# Patient Record
Sex: MALE | Race: OTHER | URBAN - METROPOLITAN AREA
[De-identification: names, ages, dates, MRNs, and addresses within clinical notes are randomized per-mention and may not be internally consistent; named-entity substitution may affect disease eponyms.]

---

## 2023-10-03 ENCOUNTER — INPATIENT (INPATIENT)
Facility: HOSPITAL | Age: 38
LOS: 0 days | Discharge: HOME | DRG: 313 | End: 2023-10-04
Attending: INTERNAL MEDICINE | Admitting: INTERNAL MEDICINE
Payer: MEDICAID

## 2023-10-03 VITALS
WEIGHT: 188.94 LBS | SYSTOLIC BLOOD PRESSURE: 112 MMHG | DIASTOLIC BLOOD PRESSURE: 70 MMHG | HEART RATE: 54 BPM | TEMPERATURE: 98 F | RESPIRATION RATE: 16 BRPM | OXYGEN SATURATION: 99 %

## 2023-10-03 DIAGNOSIS — R07.9 CHEST PAIN, UNSPECIFIED: ICD-10-CM

## 2023-10-03 DIAGNOSIS — I25.10 ATHEROSCLEROTIC HEART DISEASE OF NATIVE CORONARY ARTERY WITHOUT ANGINA PECTORIS: ICD-10-CM

## 2023-10-03 LAB
ANION GAP SERPL CALC-SCNC: 8 MMOL/L — SIGNIFICANT CHANGE UP (ref 5–17)
BUN SERPL-MCNC: 20 MG/DL — SIGNIFICANT CHANGE UP (ref 7–23)
CALCIUM SERPL-MCNC: 9 MG/DL — SIGNIFICANT CHANGE UP (ref 8.4–10.5)
CHLORIDE SERPL-SCNC: 102 MMOL/L — SIGNIFICANT CHANGE UP (ref 96–108)
CK MB CFR SERPL CALC: 2.4 NG/ML — SIGNIFICANT CHANGE UP (ref 0–6.7)
CK SERPL-CCNC: 209 U/L — HIGH (ref 30–200)
CO2 SERPL-SCNC: 27 MMOL/L — SIGNIFICANT CHANGE UP (ref 22–31)
CREAT SERPL-MCNC: 0.84 MG/DL — SIGNIFICANT CHANGE UP (ref 0.5–1.3)
CRP SERPL-MCNC: <3 MG/L — SIGNIFICANT CHANGE UP (ref 0–4)
D DIMER BLD IA.RAPID-MCNC: <150 NG/ML DDU — SIGNIFICANT CHANGE UP
EGFR: 116 ML/MIN/1.73M2 — SIGNIFICANT CHANGE UP
ERYTHROCYTE [SEDIMENTATION RATE] IN BLOOD: 5 MM/HR — SIGNIFICANT CHANGE UP
GLUCOSE SERPL-MCNC: 86 MG/DL — SIGNIFICANT CHANGE UP (ref 70–99)
HCT VFR BLD CALC: 41.3 % — SIGNIFICANT CHANGE UP (ref 39–50)
HGB BLD-MCNC: 13.5 G/DL — SIGNIFICANT CHANGE UP (ref 13–17)
MCHC RBC-ENTMCNC: 28.6 PG — SIGNIFICANT CHANGE UP (ref 27–34)
MCHC RBC-ENTMCNC: 32.7 GM/DL — SIGNIFICANT CHANGE UP (ref 32–36)
MCV RBC AUTO: 87.5 FL — SIGNIFICANT CHANGE UP (ref 80–100)
NRBC # BLD: 0 /100 WBCS — SIGNIFICANT CHANGE UP (ref 0–0)
NT-PROBNP SERPL-SCNC: <36 PG/ML — SIGNIFICANT CHANGE UP (ref 0–300)
PLATELET # BLD AUTO: 277 K/UL — SIGNIFICANT CHANGE UP (ref 150–400)
POTASSIUM SERPL-MCNC: 4.7 MMOL/L — SIGNIFICANT CHANGE UP (ref 3.5–5.3)
POTASSIUM SERPL-SCNC: 4.7 MMOL/L — SIGNIFICANT CHANGE UP (ref 3.5–5.3)
RBC # BLD: 4.72 M/UL — SIGNIFICANT CHANGE UP (ref 4.2–5.8)
RBC # FLD: 12.2 % — SIGNIFICANT CHANGE UP (ref 10.3–14.5)
SODIUM SERPL-SCNC: 137 MMOL/L — SIGNIFICANT CHANGE UP (ref 135–145)
TROPONIN T, HIGH SENSITIVITY RESULT: <6 NG/L — SIGNIFICANT CHANGE UP (ref 0–51)
TROPONIN T, HIGH SENSITIVITY RESULT: <6 NG/L — SIGNIFICANT CHANGE UP (ref 0–51)
WBC # BLD: 6.9 K/UL — SIGNIFICANT CHANGE UP (ref 3.8–10.5)
WBC # FLD AUTO: 6.9 K/UL — SIGNIFICANT CHANGE UP (ref 3.8–10.5)

## 2023-10-03 PROCEDURE — 93321 DOPPLER ECHO F-UP/LMTD STD: CPT | Mod: 26

## 2023-10-03 PROCEDURE — 93308 TTE F-UP OR LMTD: CPT | Mod: 26

## 2023-10-03 PROCEDURE — 74174 CTA ABD&PLVS W/CONTRAST: CPT | Mod: 26,MA

## 2023-10-03 PROCEDURE — 99223 1ST HOSP IP/OBS HIGH 75: CPT

## 2023-10-03 PROCEDURE — 71045 X-RAY EXAM CHEST 1 VIEW: CPT | Mod: 26

## 2023-10-03 PROCEDURE — 71275 CT ANGIOGRAPHY CHEST: CPT | Mod: 26,MA

## 2023-10-03 PROCEDURE — 93010 ELECTROCARDIOGRAM REPORT: CPT

## 2023-10-03 PROCEDURE — 99291 CRITICAL CARE FIRST HOUR: CPT

## 2023-10-03 RX ORDER — ASPIRIN/CALCIUM CARB/MAGNESIUM 324 MG
325 TABLET ORAL ONCE
Refills: 0 | Status: COMPLETED | OUTPATIENT
Start: 2023-10-03 | End: 2023-10-03

## 2023-10-03 RX ORDER — INFLUENZA VIRUS VACCINE 15; 15; 15; 15 UG/.5ML; UG/.5ML; UG/.5ML; UG/.5ML
0.5 SUSPENSION INTRAMUSCULAR ONCE
Refills: 0 | Status: DISCONTINUED | OUTPATIENT
Start: 2023-10-03 | End: 2023-10-04

## 2023-10-03 RX ORDER — SODIUM CHLORIDE 9 MG/ML
1000 INJECTION INTRAMUSCULAR; INTRAVENOUS; SUBCUTANEOUS ONCE
Refills: 0 | Status: COMPLETED | OUTPATIENT
Start: 2023-10-03 | End: 2023-10-03

## 2023-10-03 RX ADMIN — Medication 325 MILLIGRAM(S): at 18:25

## 2023-10-03 RX ADMIN — SODIUM CHLORIDE 1000 MILLILITER(S): 9 INJECTION INTRAMUSCULAR; INTRAVENOUS; SUBCUTANEOUS at 20:12

## 2023-10-03 NOTE — H&P ADULT - PROBLEM SELECTOR PLAN 1
- Intermittent sharp, mid-sternal, non-radiating, chest pain w/ episodes lasting ~30 seconds; was sent via EMS from [facility] due to EKG findings.   - EKG: SB w/ LAKISHA V2-V4 w/ biphasic Twaves; HS Trop T < 6.   - Limited TTE in ED normal LV function, no RWMA, mild LVH, and trace TR.   - CTA Chest/Abd/Pelvis (-) for aortic dissection or aneurysm.   - OF NOTE: pt does report having "really bad cold" ~2wks ago suggestive of possible viral URI.   - PLAN: admit for repeat cardiac enzymes and EKGs. Official TTE in AM. ESR/CRP/D-dimer ordered. Presentation consistent with post-viral infection myocarditis, less likely ACS presentation.     DVT ppx: pt ambulatory  Dispo: pending further work up - Intermittent sharp, mid-sternal, non-radiating, chest pain w/ episodes lasting ~30 seconds; was sent via EMS from [facility] due to EKG findings.   - EKG: SB w/ LAKISHA V2-V4 w/ biphasic Twaves; HS Trop T < 6.   - Limited TTE in ED normal LV function, no RWMA, mild LVH, and trace TR.   - CTA Chest/Abd/Pelvis (-) for aortic dissection or aneurysm.   - OF NOTE: pt does report having "really bad cold" ~2wks ago suggestive of possible viral URI.   - PLAN: admit for repeat cardiac enzymes and EKGs. Official TTE in AM. ESR/CRP/D-dimer ordered. Presentation consistent with post-viral infection pericarditis less likely ACS presentation.     DVT ppx: pt ambulatory  Dispo: pending further work up

## 2023-10-03 NOTE — ED ADULT NURSE NOTE - NSFALLUNIVINTERV_ED_ALL_ED
Bed/Stretcher in lowest position, wheels locked, appropriate side rails in place/Call bell, personal items and telephone in reach/Instruct patient to call for assistance before getting out of bed/chair/stretcher/Non-slip footwear applied when patient is off stretcher/[place] to call system/Physically safe environment - no spills, clutter or unnecessary equipment/Purposeful proactive rounding/Room/bathroom lighting operational, light cord in reach

## 2023-10-03 NOTE — PATIENT PROFILE ADULT - FALL HARM RISK - UNIVERSAL INTERVENTIONS
Bed in lowest position, wheels locked, appropriate side rails in place/Call bell, personal items and telephone in reach/Instruct patient to call for assistance before getting out of bed or chair/Non-slip footwear when patient is out of bed/[place] to call system/Physically safe environment - no spills, clutter or unnecessary equipment/Purposeful Proactive Rounding/Room/bathroom lighting operational, light cord in reach
4 = No assist / stand by assistance

## 2023-10-03 NOTE — H&P ADULT - ASSESSMENT
Pt is 37yo Male w/ no reported PMHx of who presented to [facility] ED endorsing sharp, non-exertional, non-reproducible, non-pleuritic midsternal chest pain that radiates to his back. Pain comes on intermittently, and he reports he was evaluated at [facility] 2 days prior to presentation and discharge home (reports he did not have EKG at that time). EKG at [facility] revealing sinus bradycardial w/ LAKISHA in V2-V4 with biphasic Twaves. Limited TTE in ED showed mild LVH, normal LV function, no RWMA, and trace MR. Interventional Cardiology consulted in ED and in light of HS Trop T <6, hemodynamically stable, and no active symptoms upon evaluation, and TTE w/ normal LV systolic fxn and no RWMA, no STEMI code called as not likely ACS presentation. CTA in ED negative for aortic dissection / aneurysmal dilation. Patient admitted to cardiac telemetry for further work up of chest discomfort.      37yo M w/ no PMHx presented to [facility] from a clinic due to abnormal EKG. Pt reporting intermittent episodes of sharp midsternal CP, non-exertional, non-reproducible. CTA (-) for aortic dissection. EKG w/ LAKISHA V2-V4 - interventional cardiology consulted in ED and no STEMI code called given asymptomatic, HS TropT < 6, TTE w/ normal EF and no RWMA. Pt reports having "really bad cold" ~2wks ago. Presentation suspicious for myocarditis in the setting of suspected recent viral URI; low suspicion for ACS. Repeat cardiac enzymes/EKG now and in AM. ESR/CRP/D-dimer ordered. Official TTE ordered for the morning.    37yo M w/ no PMHx presented to [facility] from a clinic due to abnormal EKG. Pt reporting intermittent episodes of sharp midsternal CP, non-exertional, non-reproducible. CTA (-) for aortic dissection. EKG w/ LAKISHA V2-V4 - interventional cardiology consulted in ED and no STEMI code called given asymptomatic, HS TropT < 6, TTE w/ normal EF and no RWMA. Pt reports having "really bad cold" ~2wks ago. Presentation suspicious for pericarditis in the setting of suspected recent viral URI; low suspicion for ACS. Repeat cardiac enzymes/EKG now and in AM. ESR/CRP/D-dimer ordered. Official TTE ordered for the morning.

## 2023-10-03 NOTE — H&P ADULT - HISTORY OF PRESENT ILLNESS
Pt is 37yo Male w/ no reported PMHx of who presented to [facility] ED endorsing sharp, non-exertional, non-reproducible, non-pleuritic midsternal chest pain that radiates to his back. Pain comes on intermittently, and he reports he was evaluated at [facility] 2 days prior to presentation and discharge home (reports he did not have EKG at that time). EKG at [facility] revealing sinus bradycardial w/ LAKISHA in V2-V4 with biphasic Twaves. Limited TTE in ED showed mild LVH, normal LV function, no RWMA, and trace MR. Interventional Cardiology consulted in ED and in light of HS Trop T <6, hemodynamically stable, and no active symptoms upon evaluation, and TTE w/ normal LV systolic fxn and no RWMA, no STEMI code called as not likely ACS presentation. CTA in ED negative for aortic dissection / aneurysmal dilation. Patient admitted to cardiac telemetry for further work up of chest discomfort.      Pt is 39yo Male w/ no reported PMHx of who presented to [facility] ED endorsing sharp, non-exertional, non-reproducible, non-pleuritic midsternal chest pain that radiates to his back. Pain comes on intermittently and episodes last ~30seconds. He went to a [facility] clinic and was sent to [facility] via ambulance due to EKG findings. EKG at [facility] revealing sinus bradycardia w/ LAKISHA in V2-V4 with biphasic Twaves. At this time patient asymptomatic. Limited TTE in ED showed mild LVH, normal LV function, no RWMA, and trace MR. Interventional Cardiology consulted in ED and in light of HS Trop T <6, hemodynamically stable, and no active symptoms upon evaluation, and TTE w/ normal LV systolic fxn and no RWMA, no STEMI code called as not likely ACS presentation. CTA in ED negative for aortic dissection / aneurysmal dilation. Patient admitted to cardiac telemetry for further work up of chest discomfort. OF NOTE, patient does report having a "really bad cold" roughly 2wks ago (home rapid covid test was negative).

## 2023-10-03 NOTE — H&P ADULT - NS ATTEND OPT1 GEN_ALL_CORE
Liquor- vodka 12 shots/10 or more I attest my time as attending is greater than 50% of the total combined time spent on qualifying patient care activities by the PA/NP and attending.

## 2023-10-03 NOTE — ED PROVIDER NOTE - NSICDXPASTSURGICALHX_GEN_ALL_CORE_FT
Progress Notes by Taina Chapman, RN at 03/13/17 02:46 PM     Author:  Taina Chapman, RN Service:  (none) Author Type:  Registered Nurse     Filed:  03/15/17 12:05 PM Encounter Date:  3/13/2017 Status:  Signed     :  Taina Chapman RN (Registered Nurse)            Pre-Authorization request started for[DM1.1T] CT A/P[DM1.1M]  In process, medical review.  · Case #[DM1.1T]  [id number][DM1.1C]  (case or reference # is not an authorization #)  Please allow 2-4 business days.[DM1.1T]        Revision History        User Key Date/Time User Provider Type Action    > DM1.1 03/15/17 12:05 PM Taina Chapman, RN Registered Nurse Sign    C - Copied, M - Manual, T - Template             PAST SURGICAL HISTORY:  No significant past surgical history

## 2023-10-03 NOTE — ED PROVIDER NOTE - PHYSICAL EXAMINATION
VITAL SIGNS: I have reviewed nursing notes and confirm.  CONSTITUTIONAL:  in no acute distress.   SKIN:  warm and dry, no acute rash.   HEAD:  normocephalic, atraumatic.  EYES: PERRL, EOM intact; conjunctiva and sclera clear.  ENT: No nasal discharge; airway clear.   NECK: Supple; non tender.  CARD: S1, S2 normal; no murmurs, gallops, or rubs. Regular rate and rhythm.   RESP:  Clear to auscultation b/l, no wheezes, rales or rhonchi.  ABD: Normal bowel sounds; soft; non-distended; non-tender; no guarding/ rebound.  MSK: Normal ROM. No clubbing, cyanosis or edema. no ttp bilat le, no cw ttp  NEURO: Alert, oriented, grossly unremarkable  PSYCH: Cooperative, mood and affect appropriate.

## 2023-10-03 NOTE — ED PROVIDER NOTE - ST/T WAVE
st elevation v2, 3, nonspecific change v4, no st depressions st elevation v2, v3, nonspecific change v4, twi 3, no st depression

## 2023-10-03 NOTE — H&P ADULT - NS ATTEND AMEND GEN_ALL_CORE FT
37yo M w/ no PMHx presented to [facility] from a clinic due to abnormal EKG.    1. Abnormal EKG  Young male from [place] without family hx of CAD or SCD, without known cardiac risk factors presents with chest/back pain and EKG concerning for Wellen's t waves. HS troponin negative x2. Echo without evidence of wall motion abnormality, CTA to rule out dissection -- negative. Given abnormal EKG, coronary CTA very reasonable, if negative, ok to discharge home.

## 2023-10-03 NOTE — ED ADULT NURSE REASSESSMENT NOTE - NS ED NURSE REASSESS COMMENT FT1
Report taken from day shift RN. Pt awake and alert, AOx4. Pt breathing spontaneously and unlabored. Pt speaking in clear, full sentences, no s/sx of acute distress. Pt denies CP at this time. Pt has no co at this time. VS documented in flow sheet.

## 2023-10-03 NOTE — ED PROVIDER NOTE - OBJECTIVE STATEMENT
39 yo M h/o poss hld (not on meds), no h/o htn, dm, tob, cad, fh cad s/o intermittent L sided cp lasting seconds, sharp, no radiation x 2 d.  Pt also noted some upper back pain but denied cp radiated to back. Pt eval at [facility] yest but reports no ekg done. Pt went to a dr today for his cp and was sent to the ER for an abnl ekg.  Pt denies current cp.  No associated sob, palpitations, le pain/swelling, recent travel, h/o similar cp.  Pt denies exertional cp/sob - plays soccer and runs 7 mi regularly.  No recent uri sx.  No change w sitting vs laying.

## 2023-10-03 NOTE — CHART NOTE - NSCHARTNOTEFT_GEN_A_CORE
37 yo M with reportedly no past medical history presented sent in by PCP given an abnormal ECG in office.    Reports that he experienced upper back pain two days prior prompting an ED visit that was relieved by ibuprofen. He also had sharp chest pain (mid sternum), non radiating, non-reproducible, non pleuritic, and not associated with exertion. He did not note and exacerbating factors, the chest pain had resolved by the time he followed up with an outpatient primary care provider.     In the ED at [facility] today:  ECG showed sinus bradycardia, LAKISHA in V2-V4 with biphasic Twaves   The patient denied chest pain at that time, still endorsed mild upper back pain.     Limited TTE:  Technically difficult study   Mild LVH  Normal LV function  No regional wall motion abnormalities  Trace MR   No AI     /70 in both arms   Labs: Normal CBC, CMP and High sens Troponin <6    Low suspicion for ACS  Recommend CTA to r/o aortic dissection given history     Discussed with on call cath attending Dr. Pascual Alvarado MD  Cardiovascular Disease Fellow, PGY-6

## 2023-10-03 NOTE — PATIENT PROFILE ADULT - FALL HARM RISK - FACTORS NURSING JUDGEMENT
Pt A&O on rounds no resp distress spouse  pt in next bed belligerent. States pt cant be disch without food. Food given to pt and distch
No
English

## 2023-10-03 NOTE — ED PROVIDER NOTE - CLINICAL SUMMARY MEDICAL DECISION MAKING FREE TEXT BOX
Pt c/o atypical cp x 2 d w/o current cp w v abnl ekg w st elevations.  Interventional fellow contacted for abnl ekg concerning for stemi w atypical story, reviewed ekg and felt pt did not meet cath lab criteria but sent his colleague to ed to do bedside echo.  Bedside echo w/o abnl wall motion.  Card request cta for dissection and, if neg, admit to card.  CTA neg.  Pt admitted to card; pt remained cp free in ed.  Rpt ekg w ongoing st abnl and shared w card team.

## 2023-10-03 NOTE — H&P ADULT - NSHPLABSRESULTS_GEN_ALL_CORE
13.5   6.90  )-----------( 277      ( 03 Oct 2023 18:04 )             41.3     137  |  102  |  20  ----------------------------<  86  4.7   |  27  |  0.84    Ca    9.0      03 Oct 2023 18:04    Urinalysis Basic - ( 03 Oct 2023 18:04 )    Color: x / Appearance: x / SG: x / pH: x  Gluc: 86 mg/dL / Ketone: x  / Bili: x / Urobili: x   Blood: x / Protein: x / Nitrite: x   Leuk Esterase: x / RBC: x / WBC x   Sq Epi: x / Non Sq Epi: x / Bacteria: x

## 2023-10-03 NOTE — ED ADULT TRIAGE NOTE - CHIEF COMPLAINT QUOTE
Pt BIBEMS from clinic for r/o pericarditis. Pt c/o intermittent cp x days. Pt denies sob, numbness/tingling, n/v. No pmh.

## 2023-10-03 NOTE — ED ADULT NURSE NOTE - OBJECTIVE STATEMENT
Patient presents to the ED complaining on and off chest pain for the past few days. Patient states that he was at a clinic and was sent here because the EKG was abnormal. Patient denies any chest pain at this time. Denies any medical history.

## 2023-10-04 VITALS
HEART RATE: 49 BPM | TEMPERATURE: 98 F | RESPIRATION RATE: 18 BRPM | OXYGEN SATURATION: 100 % | DIASTOLIC BLOOD PRESSURE: 63 MMHG | SYSTOLIC BLOOD PRESSURE: 119 MMHG

## 2023-10-04 LAB
A1C WITH ESTIMATED AVERAGE GLUCOSE RESULT: 5.6 % — SIGNIFICANT CHANGE UP (ref 4–5.6)
ALBUMIN SERPL ELPH-MCNC: 4 G/DL — SIGNIFICANT CHANGE UP (ref 3.3–5)
ALP SERPL-CCNC: 98 U/L — SIGNIFICANT CHANGE UP (ref 40–120)
ALT FLD-CCNC: 104 U/L — HIGH (ref 10–45)
AMPHET UR-MCNC: NEGATIVE — SIGNIFICANT CHANGE UP
AMYLASE P1 CFR SERPL: 79 U/L — SIGNIFICANT CHANGE UP (ref 25–125)
ANION GAP SERPL CALC-SCNC: 11 MMOL/L — SIGNIFICANT CHANGE UP (ref 5–17)
ANION GAP SERPL CALC-SCNC: 7 MMOL/L — SIGNIFICANT CHANGE UP (ref 5–17)
AST SERPL-CCNC: 38 U/L — SIGNIFICANT CHANGE UP (ref 10–40)
BARBITURATES UR SCN-MCNC: NEGATIVE — SIGNIFICANT CHANGE UP
BENZODIAZ UR-MCNC: NEGATIVE — SIGNIFICANT CHANGE UP
BILIRUB SERPL-MCNC: 0.3 MG/DL — SIGNIFICANT CHANGE UP (ref 0.2–1.2)
BUN SERPL-MCNC: 15 MG/DL — SIGNIFICANT CHANGE UP (ref 7–23)
BUN SERPL-MCNC: 17 MG/DL — SIGNIFICANT CHANGE UP (ref 7–23)
CALCIUM SERPL-MCNC: 8.8 MG/DL — SIGNIFICANT CHANGE UP (ref 8.4–10.5)
CALCIUM SERPL-MCNC: 9.2 MG/DL — SIGNIFICANT CHANGE UP (ref 8.4–10.5)
CALCIUM SERPL-MCNC: 9.4 MG/DL — SIGNIFICANT CHANGE UP (ref 8.4–10.5)
CHLORIDE SERPL-SCNC: 102 MMOL/L — SIGNIFICANT CHANGE UP (ref 96–108)
CHLORIDE SERPL-SCNC: 103 MMOL/L — SIGNIFICANT CHANGE UP (ref 96–108)
CHOLEST SERPL-MCNC: 196 MG/DL — SIGNIFICANT CHANGE UP
CK MB CFR SERPL CALC: 2.1 NG/ML — SIGNIFICANT CHANGE UP (ref 0–6.7)
CK SERPL-CCNC: 185 U/L — SIGNIFICANT CHANGE UP (ref 30–200)
CO2 SERPL-SCNC: 23 MMOL/L — SIGNIFICANT CHANGE UP (ref 22–31)
CO2 SERPL-SCNC: 28 MMOL/L — SIGNIFICANT CHANGE UP (ref 22–31)
COCAINE METAB.OTHER UR-MCNC: NEGATIVE — SIGNIFICANT CHANGE UP
CREAT SERPL-MCNC: 0.74 MG/DL — SIGNIFICANT CHANGE UP (ref 0.5–1.3)
CREAT SERPL-MCNC: 0.78 MG/DL — SIGNIFICANT CHANGE UP (ref 0.5–1.3)
EGFR: 119 ML/MIN/1.73M2 — SIGNIFICANT CHANGE UP
EGFR: 120 ML/MIN/1.73M2 — SIGNIFICANT CHANGE UP
ESTIMATED AVERAGE GLUCOSE: 114 MG/DL — SIGNIFICANT CHANGE UP (ref 68–114)
GLUCOSE SERPL-MCNC: 117 MG/DL — HIGH (ref 70–99)
GLUCOSE SERPL-MCNC: 88 MG/DL — SIGNIFICANT CHANGE UP (ref 70–99)
HCT VFR BLD CALC: 41.9 % — SIGNIFICANT CHANGE UP (ref 39–50)
HDLC SERPL-MCNC: 38 MG/DL — LOW
HGB BLD-MCNC: 13.5 G/DL — SIGNIFICANT CHANGE UP (ref 13–17)
LIDOCAIN IGE QN: 31 U/L — SIGNIFICANT CHANGE UP (ref 7–60)
LIPID PNL WITH DIRECT LDL SERPL: 142 MG/DL — HIGH
MAGNESIUM SERPL-MCNC: 2 MG/DL — SIGNIFICANT CHANGE UP (ref 1.6–2.6)
MCHC RBC-ENTMCNC: 28.6 PG — SIGNIFICANT CHANGE UP (ref 27–34)
MCHC RBC-ENTMCNC: 32.2 GM/DL — SIGNIFICANT CHANGE UP (ref 32–36)
MCV RBC AUTO: 88.8 FL — SIGNIFICANT CHANGE UP (ref 80–100)
METHADONE UR-MCNC: NEGATIVE — SIGNIFICANT CHANGE UP
NON HDL CHOLESTEROL: 158 MG/DL — HIGH
NRBC # BLD: 0 /100 WBCS — SIGNIFICANT CHANGE UP (ref 0–0)
OPIATES UR-MCNC: NEGATIVE — SIGNIFICANT CHANGE UP
PCP SPEC-MCNC: SIGNIFICANT CHANGE UP
PCP UR-MCNC: NEGATIVE — SIGNIFICANT CHANGE UP
PHOSPHATE SERPL-MCNC: 3.1 MG/DL — SIGNIFICANT CHANGE UP (ref 2.5–4.5)
PLATELET # BLD AUTO: 258 K/UL — SIGNIFICANT CHANGE UP (ref 150–400)
POTASSIUM SERPL-MCNC: 4 MMOL/L — SIGNIFICANT CHANGE UP (ref 3.5–5.3)
POTASSIUM SERPL-MCNC: 4.3 MMOL/L — SIGNIFICANT CHANGE UP (ref 3.5–5.3)
POTASSIUM SERPL-SCNC: 4 MMOL/L — SIGNIFICANT CHANGE UP (ref 3.5–5.3)
POTASSIUM SERPL-SCNC: 4.3 MMOL/L — SIGNIFICANT CHANGE UP (ref 3.5–5.3)
PROT SERPL-MCNC: 7.4 G/DL — SIGNIFICANT CHANGE UP (ref 6–8.3)
RBC # BLD: 4.72 M/UL — SIGNIFICANT CHANGE UP (ref 4.2–5.8)
RBC # FLD: 12.4 % — SIGNIFICANT CHANGE UP (ref 10.3–14.5)
SODIUM SERPL-SCNC: 137 MMOL/L — SIGNIFICANT CHANGE UP (ref 135–145)
SODIUM SERPL-SCNC: 137 MMOL/L — SIGNIFICANT CHANGE UP (ref 135–145)
T4 AB SER-ACNC: 6.64 UG/DL — SIGNIFICANT CHANGE UP (ref 4.5–11.7)
THC UR QL: POSITIVE
TRIGL SERPL-MCNC: 80 MG/DL — SIGNIFICANT CHANGE UP
TROPONIN T, HIGH SENSITIVITY RESULT: 6 NG/L — SIGNIFICANT CHANGE UP (ref 0–51)
TSH SERPL-MCNC: 3.35 UIU/ML — SIGNIFICANT CHANGE UP (ref 0.27–4.2)
WBC # BLD: 4.92 K/UL — SIGNIFICANT CHANGE UP (ref 3.8–10.5)
WBC # FLD AUTO: 4.92 K/UL — SIGNIFICANT CHANGE UP (ref 3.8–10.5)

## 2023-10-04 PROCEDURE — 80307 DRUG TEST PRSMV CHEM ANLYZR: CPT

## 2023-10-04 PROCEDURE — 99291 CRITICAL CARE FIRST HOUR: CPT | Mod: 25

## 2023-10-04 PROCEDURE — 84436 ASSAY OF TOTAL THYROXINE: CPT

## 2023-10-04 PROCEDURE — 86140 C-REACTIVE PROTEIN: CPT

## 2023-10-04 PROCEDURE — 84484 ASSAY OF TROPONIN QUANT: CPT

## 2023-10-04 PROCEDURE — 83735 ASSAY OF MAGNESIUM: CPT

## 2023-10-04 PROCEDURE — 75574 CT ANGIO HRT W/3D IMAGE: CPT

## 2023-10-04 PROCEDURE — 82150 ASSAY OF AMYLASE: CPT

## 2023-10-04 PROCEDURE — 71045 X-RAY EXAM CHEST 1 VIEW: CPT

## 2023-10-04 PROCEDURE — 93306 TTE W/DOPPLER COMPLETE: CPT

## 2023-10-04 PROCEDURE — 83880 ASSAY OF NATRIURETIC PEPTIDE: CPT

## 2023-10-04 PROCEDURE — 84443 ASSAY THYROID STIM HORMONE: CPT

## 2023-10-04 PROCEDURE — 82310 ASSAY OF CALCIUM: CPT

## 2023-10-04 PROCEDURE — 85379 FIBRIN DEGRADATION QUANT: CPT

## 2023-10-04 PROCEDURE — 71275 CT ANGIOGRAPHY CHEST: CPT | Mod: MA

## 2023-10-04 PROCEDURE — 80061 LIPID PANEL: CPT

## 2023-10-04 PROCEDURE — 82550 ASSAY OF CK (CPK): CPT

## 2023-10-04 PROCEDURE — 75574 CT ANGIO HRT W/3D IMAGE: CPT | Mod: 26

## 2023-10-04 PROCEDURE — 80048 BASIC METABOLIC PNL TOTAL CA: CPT

## 2023-10-04 PROCEDURE — 85027 COMPLETE CBC AUTOMATED: CPT

## 2023-10-04 PROCEDURE — 93306 TTE W/DOPPLER COMPLETE: CPT | Mod: 26

## 2023-10-04 PROCEDURE — 83690 ASSAY OF LIPASE: CPT

## 2023-10-04 PROCEDURE — 36415 COLL VENOUS BLD VENIPUNCTURE: CPT

## 2023-10-04 PROCEDURE — 93005 ELECTROCARDIOGRAM TRACING: CPT

## 2023-10-04 PROCEDURE — 93010 ELECTROCARDIOGRAM REPORT: CPT

## 2023-10-04 PROCEDURE — 85652 RBC SED RATE AUTOMATED: CPT

## 2023-10-04 PROCEDURE — 80053 COMPREHEN METABOLIC PANEL: CPT

## 2023-10-04 PROCEDURE — 83036 HEMOGLOBIN GLYCOSYLATED A1C: CPT

## 2023-10-04 PROCEDURE — 82553 CREATINE MB FRACTION: CPT

## 2023-10-04 PROCEDURE — 84100 ASSAY OF PHOSPHORUS: CPT

## 2023-10-04 PROCEDURE — 74174 CTA ABD&PLVS W/CONTRAST: CPT | Mod: MA

## 2023-10-04 RX ORDER — ONDANSETRON 8 MG/1
4 TABLET, FILM COATED ORAL ONCE
Refills: 0 | Status: COMPLETED | OUTPATIENT
Start: 2023-10-04 | End: 2023-10-04

## 2023-10-04 RX ORDER — SODIUM CHLORIDE 9 MG/ML
1000 INJECTION INTRAMUSCULAR; INTRAVENOUS; SUBCUTANEOUS
Refills: 0 | Status: DISCONTINUED | OUTPATIENT
Start: 2023-10-04 | End: 2023-10-04

## 2023-10-04 RX ADMIN — SODIUM CHLORIDE 100 MILLILITER(S): 9 INJECTION INTRAMUSCULAR; INTRAVENOUS; SUBCUTANEOUS at 09:22

## 2023-10-04 RX ADMIN — ONDANSETRON 4 MILLIGRAM(S): 8 TABLET, FILM COATED ORAL at 13:59

## 2023-10-04 NOTE — DISCHARGE NOTE PROVIDER - HOSPITAL COURSE
39 y/o Male w/ no reported PMHx of who presented to [facility] ED endorsing sharp, non-exertional, non-reproducible, non-pleuritic midsternal chest pain that radiates to his back. Pain comes on intermittently and episodes last ~30seconds. He went to a [facility] clinic and was sent to [facility] via ambulance due to EKG findings. EKG at [facility] revealing sinus bradycardia w/ LAKISHA in V2-V4 with biphasic Twaves. At this time patient asymptomatic. Limited TTE in ED showed mild LVH, normal LV function, no RWMA, and trace MR. Interventional Cardiology consulted in ED and in light of HS Trop T <6, hemodynamically stable, and no active symptoms upon evaluation, and TTE w/ normal LV systolic fxn and no RWMA, no STEMI code called as not likely ACS presentation. CTA in ED negative for aortic dissection / aneurysmal dilation. Patient admitted to cardiac telemetry for further work up of chest discomfort. OF NOTE, patient does report having a "really bad cold" roughly 2wks ago (home rapid covid test was negative). 37 y/o Male w/ no pertinent FamHx or PMHx, recent URI 2wks ago, of who presented to [facility] ED endorsing sharp, non-exertional, non-reproducible, non-pleuritic midsternal chest pain that radiates to his back. Reports been having intermittent upper L back pain x 2 weeks, and developed chest pain episode that lasted ~30seconds. He went to [facility] clinic and was sent to [facility] via ambulance due to EKG findings. EKG at [facility] revealing sinus bradycardia w/ LAKISHA in V2-V4 with biphasic Twaves. Repeat EKG NSR 60s, ST elevation V1-V4 Remained asymptomatic. Limited TTE showed mild LVH, normal LV function, no RWMA, and trace MR. HS Trop T neg x3. Per interventional cardiology, no STEMI code called as not likely ACS presentation. CTA chest/abd/pelvis negative for aortic dissection / aneurysmal dilation. Admitted to cardiac telemetry for further work up of chest discomfort. 39 y/o Male w/ no pertinent FamHx or PMHx, recent URI 2wks ago, of who presented to [facility] ED endorsing sharp, non-exertional, non-reproducible, non-pleuritic midsternal chest pain that radiates to his back, chest pain episode that lasted ~30seconds. Reports been having intermittent upper L back pain x 2 weeks worse w/ turning head to the right. He went to [facility] clinic and was sent to [facility] via ambulance due to EKG findings. EKG at [facility] revealing sinus bradycardia w/ LAKISHA in V2-V4 with biphasic Twaves. Repeat EKG NSR 60s, progressive ST elevation V1-V4. Remained asymptomatic. Limited TTE showed mild LVH, normal LV function, no RWMA, and trace MR. HS Trop T neg x3. Per interventional cardiology, no STEMI code called as not likely ACS presentation. CTA chest/abd/pelvis negative for aortic dissection / aneurysmal dilation. Admitted to cardiac telemetry and ruled out for ACS. CCTA w/ zero calcium score and Normal coronary arteries.     On the day of discharge, the patient was seen and examined. Symptoms resolved. Vital signs are stable. Labs and imaging reviewed. Patient is medically optimized and hemodynamically stable. Return precautions discussed, medication teach back done, and importance of physician followup emphasized. The patient verbalized understanding. 39 y/o Male w/ no pertinent FamHx or PMHx, recent URI 2wks ago, of who presented to [facility] ED endorsing sharp, non-exertional, non-reproducible, non-pleuritic midsternal chest pain that radiates to his back, chest pain episode that lasted ~30seconds. Reports been having intermittent upper L back pain x 2 weeks worse w/ turning head to the right. He went to [facility] clinic and was sent to [facility] via ambulance due to EKG findings. EKG at [facility] revealing sinus bradycardia w/ LAKISHA in V2-V4 with biphasic Twaves. Repeat EKG NSR 60s, progressive ST elevation V1-V4. Pt remained asymptomatic. Limited TTE showed mild LVH, normal LV function, no RWMA, and trace MR. HS Trop T neg x3. Per interventional cardiology, no STEMI code called as not likely ACS presentation. CTA chest/abd/pelvis negative for aortic dissection / aneurysmal dilation. Admitted to cardiac telemetry and ruled out for ACS. CCTA w/ zero calcium score and Normal coronary arteries.     On the day of discharge, the patient was seen and examined. Symptoms resolved. Vital signs are stable. Labs and imaging reviewed. Patient is medically optimized and hemodynamically stable. Return precautions discussed, medication teach back done, and importance of physician followup emphasized. The patient verbalized understanding.

## 2023-10-04 NOTE — DISCHARGE NOTE PROVIDER - NSDCCPCAREPLAN_GEN_ALL_CORE_FT
PRINCIPAL DISCHARGE DIAGNOSIS  Diagnosis: Chest pain  Assessment and Plan of Treatment: You came into the hospital due to chest pain and abnormal EKG, which could be a normal variant for you. You underwent cardiac workup including echocardiogram, CT Scan of the Heart which were unremarkable. You also had CT Scan of the Chest, Abdomen, Pelvis that was unremarkable. You were given a copy of the EKG for your files. Follow up with your primary care physician for ongoing care.

## 2023-10-04 NOTE — DISCHARGE NOTE NURSING/CASE MANAGEMENT/SOCIAL WORK - PATIENT PORTAL LINK FT
You can access the FollowMyHealth Patient Portal offered by [facility] by registering at the following website: [email or web address]. By joining [employer]’s FollowMyHealth portal, you will also be able to view your health information using other applications (apps) compatible with our system.

## 2023-10-09 DIAGNOSIS — R07.9 CHEST PAIN, UNSPECIFIED: ICD-10-CM

## 2023-10-09 DIAGNOSIS — R94.31 ABNORMAL ELECTROCARDIOGRAM [ECG] [EKG]: ICD-10-CM

## 2024-04-03 NOTE — DISCHARGE NOTE PROVIDER - NSDCHC_MEDRECSTATUS_GEN_ALL_CORE
Admission Reconciliation is Not Complete  Discharge Reconciliation is Not Complete No restrictions Admission Reconciliation is Completed  Discharge Reconciliation is Completed

## 2024-10-31 NOTE — ED PROVIDER NOTE - EKG ADDITIONAL QUESTION - PERFORMED INDEPENDENT VISUALIZATION
Acute Care - Physical Therapy Treatment Note   [facility]     Patient Name: Gilberto Sanders  : 1948  MRN: [id number]  Today's Date: 10/31/2024      Visit Dx:     ICD-10-CM ICD-9-CM   1. Difficulty in walking  R26.2 719.7   2. Closed displaced fracture of right patella, unspecified fracture morphology, initial encounter  S82.001A 822.0     Patient Active Problem List   Diagnosis    S/P CABG x10 by Dr. Kruger    Coronary heart disease    Hyperlipidemia    Hypertension    Skin disorder    Status post coronary artery stent placement    Status post coronary artery bypass graft    Paroxysmal atrial fibrillation    Sick sinus syndrome    Acute respiratory failure with hypoxia    Closed displaced fracture of right patella    Right patella fracture     Past Medical History:   Diagnosis Date    -2023    Chronic kidney disease (CKD)     Coronary artery disease     Hyperlipidemia     Hypertension     Multiple rib fractures     from a fall early spring 2023, healed without intervention    Myocardial infarction     RSV (acute bronchiolitis due to respiratory syncytial virus) 2024    Skin cancer     back, chest, arms (lots of things burned off)     Past Surgical History:   Procedure Laterality Date    ABLATION OF DYSRHYTHMIC FOCUS      APPENDECTOMY      CARDIAC ELECTROPHYSIOLOGY PROCEDURE N/A 2023    Procedure: Ablation atrial fibrillation, RF, rep emailed;  Surgeon: Adrian Valentino MD;  Location: [facility] INVASIVE LOCATION;  Service: Cardiovascular;  Laterality: N/A;    CORONARY ANGIOPLASTY      CORONARY ARTERY BYPASS GRAFT  08/30/2017    X10 Dr Kruger    CORONARY STENT PLACEMENT  1984    3 stent    PATELLA OPEN REDUCTION INTERNAL FIXATION Right 10/28/2024    Procedure: PATELLA OPEN REDUCTION INTERNAL FIXATION;  Surgeon: Aníbal Montoya MD;  [facility] [place] [facility]  Service: Orthopedics;  Laterality: Right;    RENAL ARTERY STENT      SKIN CANCER EXCISION       PT Assessment (Last 12 Hours)        PT Evaluation and Treatment       Row Name 10/31/24 1530          Physical Therapy Time and Intention    Subjective Information no complaints  -     Document Type therapy note (daily note)  -     Mode of Treatment individual therapy;physical therapy  -     Patient Effort good  -SM     Symptoms Noted During/After Treatment none  -SM       Row Name 10/31/24 1530          Pain    Pretreatment Pain Rating 3/10  -SM     Posttreatment Pain Rating 3/10  -SM     Pain Location knee  -     Pain Side/Orientation right  -       Row Name 10/31/24 1530          Mobility    Extremity Weight-bearing Status right lower extremity  -     Right Lower Extremity (Weight-bearing Status) weight-bearing as tolerated (WBAT)  -       Row Name 10/31/24 1530          Bed Mobility    Supine-Sit [place] (Bed Mobility) contact guard  -     Sit-Supine [place] (Bed Mobility) contact guard  -     Bed Mobility, Safety Issues decreased use of legs for bridging/pushing  -     Assistive Device (Bed Mobility) bed rails  -       Row Name 10/31/24 1530          Transfers    Transfers sit-stand transfer;stand-sit transfer  -     Maintains Weight-bearing Status (Transfers) able to maintain  -       Row Name 10/31/24 1530          Sit-Stand Transfer    Sit-Stand [place] (Transfers) contact guard  -     Assistive Device (Sit-Stand Transfers) walker, front-wheeled  -       Row Name 10/31/24 1530          Stand-Sit Transfer    Stand-Sit [place] (Transfers) contact guard  -     Assistive Device (Stand-Sit Transfers) walker, front-wheeled  -       Row Name 10/31/24 1530          Gait/Stairs (Locomotion)    Gait/Stairs Locomotion gait/ambulation assistive device  -     [place] Level (Gait) contact guard  -     Assistive Device (Gait) walker, front-wheeled  -     Patient was able to Ambulate yes  -SM     Distance in Feet (Gait) 100  -SM     Pattern (Gait) step-to  -SM     Deviations/Abnormal  Patterns (Gait) weight shifting decreased;base of support, wide  -     Bilateral Gait Deviations forward flexed posture  -     Right Sided Gait Deviations weight shift ability decreased;hip circumduction  -       Row Name 10/31/24 1530          Balance    Dynamic Standing Balance contact guard  -     Position/Device Used, Standing Balance walker, front-wheeled  -       Row Name 10/31/24 1530          Motor Skills    Therapeutic Exercise hip;knee;ankle  20x ankle pumps, quad sets, glute sets. All TE performed supien with locked brace in use.  -       Row Name             Wound 10/28/24 Right anterior knee    Wound - Properties Group Placement Date: 10/28/24  - Side: Right  -JM Orientation: anterior  -JM Location: knee  -JM Primary Wound Type: Incision  -JM    Retired Wound - Properties Group Placement Date: 10/28/24  - Side: Right  -JM Orientation: anterior  -JM Location: knee  -JM Primary Wound Type: Incision  -JM    Retired Wound - Properties Group Placement Date: 10/28/24  - Side: Right  -JM Orientation: anterior  -JM Location: knee  -JM Primary Wound Type: Incision  -JM    Retired Wound - Properties Group Date first assessed: 10/28/24  - Side: Right  -JM Location: knee  -JM Primary Wound Type: Incision  -JM      Row Name 10/31/24 1530          Positioning and Restraints    Pre-Treatment Position in bed  -     Post Treatment Position bed  -     In Bed supine;call light within reach;exit alarm on;legs elevated  -       Row Name 10/31/24 1530          Progress Summary (PT)    Progress Toward Functional Goals (PT) progress toward functional goals is good  -     Daily Progress Summary (PT) Pt tolerated TE and ambulation well, continue with POC.  -               User Key  (r) = Recorded By, (t) = Taken By, (c) = Cosigned By      Initials Name Provider Type    Jaleesa Moore RN Registered Nurse    Re Camacho PTA Physical Therapist Assistant                    Physical Therapy  Education        No education to display                  PT Recommendation and Plan     Progress Summary (PT)  Progress Toward Functional Goals (PT): progress toward functional goals is good  Daily Progress Summary (PT): Pt tolerated TE and ambulation well, continue with POC.   Outcome Measures       Row Name 10/31/24 1535 10/30/24 1500 10/29/24 1000       How much help from another person do you currently need...    Turning from your back to your side while in flat bed without using bedrails? 4  - 3  -YANELI (r) EW (t) YANELI (c) 3  -YANELI    Moving from lying on back to sitting on the side of a flat bed without bedrails? 4  - 3  -YANELI (r) EW (t) YANELI (c) 3  -YANELI    Moving to and from a bed to a chair (including a wheelchair)? 3  - 3  -YANELI (r) EW (t) YANELI (c) 3  -YANELI    Standing up from a chair using your arms (e.g., wheelchair, bedside chair)? 4  - 3  -YANELI (r) EW (t) YANELI (c) 2  -YANELI    Climbing 3-5 steps with a railing? 3  - 3  -YANELI (r) EW (t) YANELI (c) 3  -YANELI    To walk in hospital room? 3  - 3  -YANELI (r) EW (t) YANELI (c) 3  -YANELI    AM-PAC 6 Clicks Score (PT) 21  - 18  -YANELI (r) EW (t) 17  -YANELI       How much help from another is currently needed...    Putting on and taking off regular lower body clothing? -- 3  -YANELI (r) EW (t) YANELI (c) --       Functional Assessment    Outcome Measure Options -- AM-PAC 6 Clicks Basic Mobility (PT)  -YANELI (r) EW (t) YANELI (c) AM-PAC 6 Clicks Basic Mobility (PT)  -YANELI              User Key  (r) = Recorded By, (t) = Taken By, (c) = Cosigned By      Initials Name Provider Type    YANELI Sean Victor, PT Physical Therapist    Re Camacho, PTA Physical Therapist Assistant    Ovidio Khan, PT Student PT Student                     Time Calculation:    PT Charges       Row Name 10/31/24 1529             Time Calculation    PT Received On 10/31/24  -         Timed Charges    72060 - PT Therapeutic Exercise Minutes 8  -SM      46912 - Gait Training Minutes  15  -SM         Total Minutes    Timed Charges  Total Minutes 23  -SM       Total Minutes 23  -SM                User Key  (r) = Recorded By, (t) = Taken By, (c) = Cosigned By      Initials Name Provider Type    Re Camacho PTA Physical Therapist Assistant                  Therapy Charges for Today       Code Description Service Date Service Provider Modifiers Qty    [id number]  PT THER PROC EA 15 MIN 10/31/2024 Re Espinoza PTA GP 1            PT G-Codes  Outcome Measure Options: AM-PAC 6 Clicks Daily Activity (OT)  AM-PAC 6 Clicks Score (PT): 21  AM-PAC 6 Clicks Score (OT): 17    Re Espinoza PTA  10/31/2024     Yes